# Patient Record
Sex: MALE | Race: OTHER | NOT HISPANIC OR LATINO | URBAN - METROPOLITAN AREA
[De-identification: names, ages, dates, MRNs, and addresses within clinical notes are randomized per-mention and may not be internally consistent; named-entity substitution may affect disease eponyms.]

---

## 2020-02-27 ENCOUNTER — EMERGENCY (EMERGENCY)
Facility: HOSPITAL | Age: 55
LOS: 1 days | Discharge: HOME | End: 2020-02-27
Attending: EMERGENCY MEDICINE
Payer: MEDICAID

## 2020-02-27 VITALS
HEART RATE: 125 BPM | DIASTOLIC BLOOD PRESSURE: 74 MMHG | SYSTOLIC BLOOD PRESSURE: 114 MMHG | HEIGHT: 67 IN | OXYGEN SATURATION: 95 % | WEIGHT: 179.9 LBS | TEMPERATURE: 103 F | RESPIRATION RATE: 18 BRPM

## 2020-02-27 VITALS — HEART RATE: 96 BPM

## 2020-02-27 PROCEDURE — 84295 ASSAY OF SERUM SODIUM: CPT

## 2020-02-27 PROCEDURE — 82803 BLOOD GASES ANY COMBINATION: CPT

## 2020-02-27 PROCEDURE — 82947 ASSAY GLUCOSE BLOOD QUANT: CPT

## 2020-02-27 PROCEDURE — 99283 EMERGENCY DEPT VISIT LOW MDM: CPT

## 2020-02-27 PROCEDURE — 80053 COMPREHEN METABOLIC PANEL: CPT

## 2020-02-27 PROCEDURE — 71045 X-RAY EXAM CHEST 1 VIEW: CPT | Mod: 26

## 2020-02-27 PROCEDURE — 85027 COMPLETE CBC AUTOMATED: CPT

## 2020-02-27 PROCEDURE — 83605 ASSAY OF LACTIC ACID: CPT

## 2020-02-27 PROCEDURE — 81001 URINALYSIS AUTO W/SCOPE: CPT

## 2020-02-27 PROCEDURE — 87040 BLOOD CULTURE FOR BACTERIA: CPT

## 2020-02-27 PROCEDURE — 82435 ASSAY OF BLOOD CHLORIDE: CPT

## 2020-02-27 PROCEDURE — 84132 ASSAY OF SERUM POTASSIUM: CPT

## 2020-02-27 PROCEDURE — 82330 ASSAY OF CALCIUM: CPT

## 2020-02-27 PROCEDURE — 87086 URINE CULTURE/COLONY COUNT: CPT

## 2020-02-27 PROCEDURE — 71045 X-RAY EXAM CHEST 1 VIEW: CPT

## 2020-02-27 PROCEDURE — 99284 EMERGENCY DEPT VISIT MOD MDM: CPT

## 2020-02-27 PROCEDURE — 87631 RESP VIRUS 3-5 TARGETS: CPT

## 2020-02-27 PROCEDURE — 85014 HEMATOCRIT: CPT

## 2020-02-27 RX ORDER — ACETAMINOPHEN 500 MG
975 TABLET ORAL ONCE
Refills: 0 | Status: COMPLETED | OUTPATIENT
Start: 2020-02-27 | End: 2020-02-27

## 2020-02-27 RX ORDER — SODIUM CHLORIDE 9 MG/ML
1000 INJECTION INTRAMUSCULAR; INTRAVENOUS; SUBCUTANEOUS ONCE
Refills: 0 | Status: COMPLETED | OUTPATIENT
Start: 2020-02-27 | End: 2020-02-27

## 2020-02-27 RX ORDER — IBUPROFEN 200 MG
600 TABLET ORAL ONCE
Refills: 0 | Status: COMPLETED | OUTPATIENT
Start: 2020-02-27 | End: 2020-02-27

## 2020-02-27 RX ADMIN — Medication 975 MILLIGRAM(S): at 07:52

## 2020-02-27 RX ADMIN — SODIUM CHLORIDE 1000 MILLILITER(S): 9 INJECTION INTRAMUSCULAR; INTRAVENOUS; SUBCUTANEOUS at 06:39

## 2020-02-27 RX ADMIN — Medication 600 MILLIGRAM(S): at 06:45

## 2020-02-27 RX ADMIN — Medication 600 MILLIGRAM(S): at 07:52

## 2020-02-27 NOTE — ED ADULT NURSE NOTE - OBJECTIVE STATEMENT
53 Yo male no pertinent medical hx c/o fever/chills, and non-productive cough since last night. Patient reports he is a  in the city and might have been exposed to flu through customers. Patient arrived with chills, diaphoresis and rigors. Patient denies recent travel or familial sick contacts. Patient reports subjective fevers at home. Patient is A&OX3, airway patent, breathing spontaneous, equal and symmetric chest rise and fall. skin warm, dry and pink. Sepsis protocol initiated base don patients symptoms and vital signs. denies chest pain, SOB, N/V/D, abdominal pain, urinary symptoms, hematuria, weakness, dizziness, numbness, tinging. Peripheral pulses present b/l. Skin warm, dry and pink. Pt placed in position of comfort. Pt educated on call bell system and provided call bell. Bed in lowest position, wheels locked, appropriate side rails raised. Pt denies needs at this time.

## 2020-02-27 NOTE — ED PROVIDER NOTE - PROGRESS NOTE DETAILS
[place] PGY-2:  Signout from attlety Landeros:  53yo M here with URI sx, clinically well appearing, to receive 2L IVF and reassess, if clinically stable can d/c [place] PGY-2:  Pt feeling better, d/w pt flu results, rec f/u w/ pcp, isolating from contacts, return precautions, will d/c

## 2020-02-27 NOTE — ED PROVIDER NOTE - ATTENDING CONTRIBUTION TO CARE
pt is a 55 y/o male with no pmhx presents with flu like sts last night, no recent travel or sick contacts, meets SS criteria, but viral do not think this is sepsis, labs, ivf, cxr, reassess. flu swab. pt is a 53 y/o male with no pmhx presents with flu like sts last night, no recent travel or sick contacts, meets SS criteria, but viral do not think this is sepsis, labs, ivf, cxr, reassess. flu swab.    [place]:  Patient with influenza A and feeling much improved.  Other labs wnl.  Patient stable for symptomatic outpatient treatment.

## 2020-02-27 NOTE — ED ADULT NURSE NOTE - NSIMPLEMENTINTERV_GEN_ALL_ED
Implemented All Universal Safety Interventions:  [place] to call system. Call bell, personal items and telephone within reach. Instruct patient to call for assistance. Room bathroom lighting operational. Non-slip footwear when patient is off stretcher. Physically safe environment: no spills, clutter or unnecessary equipment. Stretcher in lowest position, wheels locked, appropriate side rails in place.

## 2020-02-27 NOTE — ED PROVIDER NOTE - PATIENT PORTAL LINK FT
You can access the FollowMyHealth Patient Portal offered by [facility] by registering at the following website: [email or web address]. By joining [facility]’s FollowMyHealth portal, you will also be able to view your health information using other applications (apps) compatible with our system.

## 2020-02-27 NOTE — ED PROVIDER NOTE - NS ED ROS FT
CONSTITUTIONAL: +F/C  Cardiovascular: No Chest pain  Respiratory: No SOB  Gastrointestinal: No n/v/d, no abd pain  Genitourinary: no dysuria, no hematuria  PSYCHIATRIC: no known mental health issues.

## 2020-02-27 NOTE — ED PROVIDER NOTE - PHYSICAL EXAMINATION
General: NAD  HEENT: pupils equal and reactive, normal external ears bilaterally   Cardiac: RRR, no MRG appreciated  Resp: lungs clear to auscultation bilaterally, symmetric chest wall rise  Abd: soft, nontender, nondistended,   : no CVA tenderness  Neuro: Moving all extremities  Skin:  normal color for race

## 2020-02-27 NOTE — ED PROVIDER NOTE - OBJECTIVE STATEMENT
55yo M no pmhx here with f/c    Onset of F/C along with cough/congestion yesterday prompting pt come in overnight. Denies CP or abd pain, tolerating PO no N/V/D. No burning on urination or frequency. Denies sick contacts, works as an [employer] .   Took tylenol at 4am

## 2020-03-02 NOTE — ED ADULT NURSE NOTE - NSHOSCREENINGQ1_ED_ALL_ED
----- Message from Litzy CHANDAdelina Allison sent at 3/2/2020 12:50 PM CST -----  Contact: [phone] Cammy daughter   Pt's daughter is requesting to speak with you re: letter for pt's living center allowing her to have ice water to deep sponge into for dry mouth. Please advise    
No

## 2022-04-20 NOTE — ED ADULT NURSE NOTE - NSFALLRSKASSESSTYPE_ED_ALL_ED
Initial (On Arrival) Xolair Counseling:  Patient informed of potential adverse effects including but not limited to fever, muscle aches, rash and allergic reactions.  The patient verbalized understanding of the proper use and possible adverse effects of Xolair.  All of the patient's questions and concerns were addressed.

## 2023-08-01 NOTE — ED ADULT NURSE NOTE - ABDOMEN
Jada wise for lab draw and results.       Port accessed and de-accessed per [facility] procedure.   See flowsheets and MAR for details.   Patient tolerated procedure well.     Patient discharged in stable, ambulatory condition with family at 11:27.       soft/nondistended/nontender
